# Patient Record
Sex: FEMALE | Race: WHITE | NOT HISPANIC OR LATINO | Employment: UNEMPLOYED | URBAN - NONMETROPOLITAN AREA
[De-identification: names, ages, dates, MRNs, and addresses within clinical notes are randomized per-mention and may not be internally consistent; named-entity substitution may affect disease eponyms.]

---

## 2020-03-18 ENCOUNTER — OFFICE VISIT (OUTPATIENT)
Dept: CARDIOLOGY | Facility: CLINIC | Age: 40
End: 2020-03-18

## 2020-03-18 VITALS
WEIGHT: 119 LBS | OXYGEN SATURATION: 99 % | DIASTOLIC BLOOD PRESSURE: 75 MMHG | HEART RATE: 76 BPM | SYSTOLIC BLOOD PRESSURE: 118 MMHG | BODY MASS INDEX: 21.9 KG/M2 | HEIGHT: 62 IN

## 2020-03-18 DIAGNOSIS — R07.2 PRECORDIAL PAIN: Primary | ICD-10-CM

## 2020-03-18 DIAGNOSIS — R06.02 SHORTNESS OF BREATH: ICD-10-CM

## 2020-03-18 DIAGNOSIS — R00.2 PALPITATIONS: ICD-10-CM

## 2020-03-18 PROCEDURE — 99204 OFFICE O/P NEW MOD 45 MIN: CPT | Performed by: PHYSICIAN ASSISTANT

## 2020-03-18 PROCEDURE — 93000 ELECTROCARDIOGRAM COMPLETE: CPT | Performed by: PHYSICIAN ASSISTANT

## 2020-03-18 RX ORDER — NADOLOL 20 MG/1
20 TABLET ORAL DAILY
COMMUNITY

## 2020-03-18 RX ORDER — CITALOPRAM 40 MG/1
40 TABLET ORAL DAILY
COMMUNITY

## 2020-03-18 NOTE — PATIENT INSTRUCTIONS

## 2020-03-18 NOTE — PROGRESS NOTES
Subjective   Tanya Alarcon is a 39 y.o. female     Chief Complaint   Patient presents with   • Establish Care     Here to establish care        HPI    Patient is a 39-year-old female that presents to the office for initial evaluation.  She has been referred in setting of chest discomfort.  Patient has no history of coronary artery disease.  Apparently in the past she has had a stress test and echocardiogram which was presumably normal.    Recently she has been dealing with anxiety.  She describes recently buying a house and having issues as well as losing a close acquaintance in the past.    When she is under stress and anxiety she will feel this discomfort more but describes it as a diffuse tightness in her chest.  This occurs at random.  However it is exacerbated under stressful situations.  She has dyspnea on occasion.  This is not a routine or persistent dyspnea when exerting but rather she becomes short of breath at random times.  She has no PND orthopnea.    She does feel palpitations on occasion.  These are minimal and manageable apparently.  She has no symptoms of dizziness presyncope or syncope or cerebral embolic events.  Otherwise is doing well      Current Outpatient Medications   Medication Sig Dispense Refill   • citalopram (CeleXA) 40 MG tablet Take 40 mg by mouth Daily.     • nadolol (CORGARD) 20 MG tablet Take 20 mg by mouth Daily.       No current facility-administered medications for this visit.        Patient has no known allergies.    Past Medical History:   Diagnosis Date   • Arrhythmia        Social History     Socioeconomic History   • Marital status: Single     Spouse name: Not on file   • Number of children: Not on file   • Years of education: Not on file   • Highest education level: Not on file   Tobacco Use   • Smoking status: Never Smoker   • Smokeless tobacco: Never Used   Substance and Sexual Activity   • Alcohol use: Never     Frequency: Never   • Drug use: Never   • Sexual activity:  "Defer       Family History   Problem Relation Age of Onset   • No Known Problems Mother    • Heart disease Father    • No Known Problems Sister    • No Known Problems Brother    • No Known Problems Sister        Review of Systems   Constitutional: Negative.  Negative for chills, fatigue and fever.   HENT: Positive for congestion.    Eyes: Positive for visual disturbance (wears contacts ).   Respiratory: Positive for chest tightness (tightness in her neck at times ) and shortness of breath (SOA with increased anxiety ).    Cardiovascular: Positive for chest pain and palpitations (palps ). Negative for leg swelling.   Gastrointestinal: Negative.    Endocrine: Positive for cold intolerance. Negative for heat intolerance.   Genitourinary: Negative.    Musculoskeletal: Positive for back pain. Negative for arthralgias.   Skin: Negative.  Negative for rash and wound.   Allergic/Immunologic: Negative.  Negative for environmental allergies and food allergies.   Neurological: Positive for light-headedness (when standing too fast ) and numbness (down left leg at times ). Negative for dizziness and weakness.   Hematological: Negative.  Does not bruise/bleed easily.   Psychiatric/Behavioral: Negative for sleep disturbance (denies waking with smothering ). The patient is nervous/anxious (increased stress recently ).    All other systems reviewed and are negative.      Objective   Vitals:    03/18/20 1242   BP: 118/75   BP Location: Left arm   Patient Position: Sitting   Pulse: 76   SpO2: 99%   Weight: 54 kg (119 lb)   Height: 157.5 cm (62\")      /75 (BP Location: Left arm, Patient Position: Sitting)   Pulse 76   Ht 157.5 cm (62\")   Wt 54 kg (119 lb)   SpO2 99%   BMI 21.77 kg/m²     Lab Results (most recent)     None          Physical Exam   Constitutional: She is oriented to person, place, and time. She appears well-developed and well-nourished. No distress.   HENT:   Head: Normocephalic and atraumatic.   Eyes: Pupils " are equal, round, and reactive to light. EOM are normal.   Neck: No JVD present.   Cardiovascular: Normal rate, regular rhythm and normal heart sounds. Exam reveals no gallop and no friction rub.   No murmur heard.  Pulmonary/Chest: Effort normal and breath sounds normal. No respiratory distress. She has no wheezes. She has no rales. She exhibits no tenderness.   Musculoskeletal: Normal range of motion. She exhibits no edema.   Neurological: She is alert and oriented to person, place, and time. No cranial nerve deficit.   Skin: Skin is warm and dry. No rash noted. No erythema. No pallor.   Psychiatric: She has a normal mood and affect. Her behavior is normal.   Nursing note and vitals reviewed.      Procedure     ECG 12 Lead  Date/Time: 3/18/2020 12:53 PM  Performed by: Zeke Melton PA  Authorized by: Zeke Melton PA   Previous ECG: no previous ECG available  Comments: EKG demonstrates sinus rhythm at 74 bpm with no acute ST changes                 Assessment/Plan     Problems Addressed this Visit        Cardiovascular and Mediastinum    Palpitations    Relevant Orders    Adult Transthoracic Echo Complete W/ Cont if Necessary Per Protocol    Treadmill Stress Test       Respiratory    Shortness of breath    Relevant Orders    Adult Transthoracic Echo Complete W/ Cont if Necessary Per Protocol    Treadmill Stress Test       Nervous and Auditory    Precordial pain - Primary    Relevant Orders    ECG 12 Lead    Adult Transthoracic Echo Complete W/ Cont if Necessary Per Protocol    Treadmill Stress Test              Recommendation  1.  Patient with complaints of chest discomfort.  Chest pain is atypical and I do feel anxiety is likely the cause of her symptoms.  However she describes a family history of coronary disease and she would like evaluation.  2.  Regular treadmill stress test will be ordered for risk stratification  3.  Echocardiogram to evaluate LV structure and function assess diastolic performance  to rule out any gross cardiac abnormality  4.  We will see her back for follow-up on testing.  She feels palpitations are manageable at this point.  We can consider monitoring in the future if this worsens.  She will follow with primary as scheduled         Tanya Alarcon  reports that she has never smoked. She has never used smokeless tobacco..         Patient's Body mass index is 21.77 kg/m². BMI is within normal parameters. No follow-up required..         Electronically signed by:

## 2020-03-24 ENCOUNTER — TELEPHONE (OUTPATIENT)
Dept: CARDIOLOGY | Facility: CLINIC | Age: 40
End: 2020-03-24

## 2020-03-24 NOTE — TELEPHONE ENCOUNTER
Pt called stating that she forgot to request a letter for work stating she missed work due to appt at our office on 3/18/2020. She stated that she's been busy and has been unable to come  a letter. I informed her that I can mail it to her, she verbalized understanding. Confirmed address w/ pt.       Mailed letter to pt.

## 2020-04-13 ENCOUNTER — HOSPITAL ENCOUNTER (OUTPATIENT)
Dept: CARDIOLOGY | Facility: HOSPITAL | Age: 40
Discharge: HOME | End: 2020-04-13

## 2020-04-13 ENCOUNTER — HOSPITAL ENCOUNTER (OUTPATIENT)
Dept: CARDIOLOGY | Facility: HOSPITAL | Age: 40
Discharge: HOME | End: 2020-04-13
Admitting: PHYSICIAN ASSISTANT

## 2020-04-13 DIAGNOSIS — R06.02 SHORTNESS OF BREATH: ICD-10-CM

## 2020-04-13 DIAGNOSIS — R07.2 PRECORDIAL PAIN: ICD-10-CM

## 2020-04-13 DIAGNOSIS — R00.2 PALPITATIONS: ICD-10-CM

## 2020-04-13 PROCEDURE — 93306 TTE W/DOPPLER COMPLETE: CPT

## 2020-04-13 PROCEDURE — 93018 CV STRESS TEST I&R ONLY: CPT | Performed by: INTERNAL MEDICINE

## 2020-04-13 PROCEDURE — 93016 CV STRESS TEST SUPVJ ONLY: CPT | Performed by: PHYSICIAN ASSISTANT

## 2020-04-13 PROCEDURE — 93306 TTE W/DOPPLER COMPLETE: CPT | Performed by: INTERNAL MEDICINE

## 2020-04-13 PROCEDURE — 93017 CV STRESS TEST TRACING ONLY: CPT

## 2020-04-14 ENCOUNTER — TELEPHONE (OUTPATIENT)
Dept: CARDIOLOGY | Facility: CLINIC | Age: 40
End: 2020-04-14

## 2020-04-14 DIAGNOSIS — R94.31 ST SEGMENT DEPRESSION: ICD-10-CM

## 2020-04-14 DIAGNOSIS — R06.02 SHORTNESS OF BREATH: ICD-10-CM

## 2020-04-14 DIAGNOSIS — R94.39 ABNORMAL STRESS TEST: Primary | ICD-10-CM

## 2020-04-14 DIAGNOSIS — R07.9 CHEST PAIN, UNSPECIFIED TYPE: ICD-10-CM

## 2020-04-14 LAB
BH CV STRESS RECOVERY BP: NORMAL MMHG
BH CV STRESS RECOVERY HR: 106 BPM
MAXIMAL PREDICTED HEART RATE: 181 BPM
PERCENT MAX PREDICTED HR: 86.74 %
STRESS BASELINE BP: NORMAL MMHG
STRESS BASELINE HR: 70 BPM
STRESS PERCENT HR: 102 %
STRESS POST ESTIMATED WORKLOAD: 10.1 METS
STRESS POST EXERCISE DUR MIN: 8 MIN
STRESS POST EXERCISE DUR SEC: 1 SEC
STRESS POST PEAK BP: NORMAL MMHG
STRESS POST PEAK HR: 157 BPM
STRESS TARGET HR: 154 BPM

## 2020-04-14 NOTE — TELEPHONE ENCOUNTER
Treadmill stress:  1.  Adequate exercise capacity without chest pain.  The patient reported dyspnea during the study.     2.  Physiologic heart rate and blood pressure responses.     3.  Beginning at approximately 2 minutes in recovery there was nearly 1 mm of horizontal ST segment depression in the inferior and anterolateral leads suggestive but not diagnostic of ischemia.     4.  Rare PVCs occurred during exercise.     Discussion this study is inconclusive for the diagnosis of ischemia.  Further risk stratification with pharmacologic stress testing and/or stress echocardiography could be considered if felt clinically indicated.    Informed pt of her results and that additional testing is needed, she verbalized understanding.

## 2020-04-14 NOTE — TELEPHONE ENCOUNTER
----- Message from CHEYENNE Burden sent at 4/14/2020 12:50 PM EDT -----  yes  ----- Message -----  From: Aleja Guzmanily  Sent: 4/14/2020  11:38 AM EDT  To: CHEYENNE Burden    So you just need me to order a avani and inform pt? I want to confirm that i'm understanding correctly.   ----- Message -----  From: Zeke Melton PA  Sent: 4/14/2020   9:41 AM EDT  To: Yazmin Guzman    Patient had set up with a Lexiscan stress test diagnosis, abnormal regular stress test, ST depression, chest pain shortness of breath

## 2020-04-16 LAB
BH CV ECHO MEAS - ACS: 1.8 CM
BH CV ECHO MEAS - AO MAX PG: 4.5 MMHG
BH CV ECHO MEAS - AO MEAN PG: 3 MMHG
BH CV ECHO MEAS - AO ROOT AREA (BSA CORRECTED): 1.5
BH CV ECHO MEAS - AO ROOT AREA: 4.2 CM^2
BH CV ECHO MEAS - AO ROOT DIAM: 2.3 CM
BH CV ECHO MEAS - AO V2 MAX: 106 CM/SEC
BH CV ECHO MEAS - AO V2 MEAN: 77.9 CM/SEC
BH CV ECHO MEAS - AO V2 VTI: 23.4 CM
BH CV ECHO MEAS - BSA(HAYCOCK): 1.5 M^2
BH CV ECHO MEAS - BSA: 1.5 M^2
BH CV ECHO MEAS - BZI_BMI: 21.8 KILOGRAMS/M^2
BH CV ECHO MEAS - BZI_METRIC_HEIGHT: 157.5 CM
BH CV ECHO MEAS - BZI_METRIC_WEIGHT: 54 KG
BH CV ECHO MEAS - EDV(CUBED): 62.1 ML
BH CV ECHO MEAS - EDV(MOD-SP4): 57.8 ML
BH CV ECHO MEAS - EDV(TEICH): 68.3 ML
BH CV ECHO MEAS - EF(CUBED): 80.4 %
BH CV ECHO MEAS - EF(MOD-SP4): 59.2 %
BH CV ECHO MEAS - EF(TEICH): 73.5 %
BH CV ECHO MEAS - ESV(CUBED): 12.2 ML
BH CV ECHO MEAS - ESV(MOD-SP4): 23.6 ML
BH CV ECHO MEAS - ESV(TEICH): 18.1 ML
BH CV ECHO MEAS - FS: 41.9 %
BH CV ECHO MEAS - IVS/LVPW: 1.1
BH CV ECHO MEAS - IVSD: 0.92 CM
BH CV ECHO MEAS - LA DIMENSION: 2.3 CM
BH CV ECHO MEAS - LA/AO: 1
BH CV ECHO MEAS - LV DIASTOLIC VOL/BSA (35-75): 37.7 ML/M^2
BH CV ECHO MEAS - LV IVRT: 0.07 SEC
BH CV ECHO MEAS - LV MASS(C)D: 105.2 GRAMS
BH CV ECHO MEAS - LV MASS(C)DI: 68.6 GRAMS/M^2
BH CV ECHO MEAS - LV SYSTOLIC VOL/BSA (12-30): 15.4 ML/M^2
BH CV ECHO MEAS - LVIDD: 4 CM
BH CV ECHO MEAS - LVIDS: 2.3 CM
BH CV ECHO MEAS - LVLD AP4: 6.9 CM
BH CV ECHO MEAS - LVLS AP4: 5.7 CM
BH CV ECHO MEAS - LVOT AREA (M): 2 CM^2
BH CV ECHO MEAS - LVOT AREA: 2 CM^2
BH CV ECHO MEAS - LVOT DIAM: 1.6 CM
BH CV ECHO MEAS - LVPWD: 0.85 CM
BH CV ECHO MEAS - MV A MAX VEL: 62.9 CM/SEC
BH CV ECHO MEAS - MV DEC SLOPE: 511 CM/SEC^2
BH CV ECHO MEAS - MV E MAX VEL: 109 CM/SEC
BH CV ECHO MEAS - MV E/A: 1.7
BH CV ECHO MEAS - RVDD: 1.8 CM
BH CV ECHO MEAS - SI(AO): 63.4 ML/M^2
BH CV ECHO MEAS - SI(CUBED): 32.6 ML/M^2
BH CV ECHO MEAS - SI(MOD-SP4): 22.3 ML/M^2
BH CV ECHO MEAS - SI(TEICH): 32.8 ML/M^2
BH CV ECHO MEAS - SV(AO): 97.2 ML
BH CV ECHO MEAS - SV(CUBED): 49.9 ML
BH CV ECHO MEAS - SV(MOD-SP4): 34.2 ML
BH CV ECHO MEAS - SV(TEICH): 50.2 ML
MAXIMAL PREDICTED HEART RATE: 181 BPM
STRESS TARGET HR: 154 BPM

## 2020-04-17 ENCOUNTER — TELEPHONE (OUTPATIENT)
Dept: CARDIOLOGY | Facility: CLINIC | Age: 40
End: 2020-04-17

## 2020-04-17 NOTE — TELEPHONE ENCOUNTER
Called patient and reviewed echo results with her. Patient was notified to keep appt for nuclear stress test as scheduled. Patient verbalized understanding and had no further questions at this time. -;[facility]      Adult Transthoracic Echo Complete W/ Cont if Necessary Per Protocol   Order: [id number]   Status:  Final result   Visible to patient:  Yes (MyChart) Dx:  Palpitations; Shortness of breath; Pr...   Details     Reading Physician Reading Date Result Priority   Guzman Abbasi MD 4/13/2020 Routine      Result Text     1.  Normal study.     2.  Details as below.

## 2020-05-11 ENCOUNTER — HOSPITAL ENCOUNTER (OUTPATIENT)
Dept: CARDIOLOGY | Facility: HOSPITAL | Age: 40
Discharge: HOME | End: 2020-05-11

## 2020-05-11 DIAGNOSIS — R94.31 ST SEGMENT DEPRESSION: ICD-10-CM

## 2020-05-11 DIAGNOSIS — R06.02 SHORTNESS OF BREATH: ICD-10-CM

## 2020-05-11 DIAGNOSIS — R94.39 ABNORMAL STRESS TEST: ICD-10-CM

## 2020-05-11 DIAGNOSIS — R07.9 CHEST PAIN, UNSPECIFIED TYPE: ICD-10-CM

## 2020-05-11 PROCEDURE — 78452 HT MUSCLE IMAGE SPECT MULT: CPT

## 2020-05-11 PROCEDURE — A9500 TC99M SESTAMIBI: HCPCS | Performed by: INTERNAL MEDICINE

## 2020-05-11 PROCEDURE — 93016 CV STRESS TEST SUPVJ ONLY: CPT | Performed by: NURSE PRACTITIONER

## 2020-05-11 PROCEDURE — 78452 HT MUSCLE IMAGE SPECT MULT: CPT | Performed by: INTERNAL MEDICINE

## 2020-05-11 PROCEDURE — 25010000002 REGADENOSON 0.4 MG/5ML SOLUTION: Performed by: INTERNAL MEDICINE

## 2020-05-11 PROCEDURE — 93018 CV STRESS TEST I&R ONLY: CPT | Performed by: INTERNAL MEDICINE

## 2020-05-11 PROCEDURE — 0 TECHNETIUM SESTAMIBI: Performed by: INTERNAL MEDICINE

## 2020-05-11 PROCEDURE — 93017 CV STRESS TEST TRACING ONLY: CPT

## 2020-05-11 RX ADMIN — TECHNETIUM TC 99M SESTAMIBI 1 DOSE: 1 INJECTION INTRAVENOUS at 10:45

## 2020-05-11 RX ADMIN — REGADENOSON 0.4 MG: 0.08 INJECTION, SOLUTION INTRAVENOUS at 10:45

## 2020-05-11 RX ADMIN — TECHNETIUM TC 99M SESTAMIBI 1 DOSE: 1 INJECTION INTRAVENOUS at 09:02

## 2020-05-12 LAB
BH CV STRESS COMMENTS STAGE 1: NORMAL
BH CV STRESS DOSE REGADENOSON STAGE 1: 0.4
BH CV STRESS DURATION MIN STAGE 1: 0
BH CV STRESS DURATION SEC STAGE 1: 10
BH CV STRESS PROTOCOL 1: NORMAL
BH CV STRESS RECOVERY BP: NORMAL MMHG
BH CV STRESS RECOVERY HR: 112 BPM
BH CV STRESS STAGE 1: 1
MAXIMAL PREDICTED HEART RATE: 181 BPM
PERCENT MAX PREDICTED HR: 66.3 %
STRESS BASELINE BP: NORMAL MMHG
STRESS BASELINE HR: 69 BPM
STRESS PERCENT HR: 78 %
STRESS POST PEAK BP: NORMAL MMHG
STRESS POST PEAK HR: 120 BPM
STRESS TARGET HR: 154 BPM

## 2020-05-21 ENCOUNTER — OFFICE VISIT (OUTPATIENT)
Dept: CARDIOLOGY | Facility: CLINIC | Age: 40
End: 2020-05-21

## 2020-05-21 VITALS
SYSTOLIC BLOOD PRESSURE: 127 MMHG | BODY MASS INDEX: 21.98 KG/M2 | OXYGEN SATURATION: 99 % | TEMPERATURE: 97.5 F | WEIGHT: 120.2 LBS | DIASTOLIC BLOOD PRESSURE: 74 MMHG | HEART RATE: 82 BPM

## 2020-05-21 DIAGNOSIS — R00.2 PALPITATIONS: ICD-10-CM

## 2020-05-21 DIAGNOSIS — R06.02 SHORTNESS OF BREATH: ICD-10-CM

## 2020-05-21 DIAGNOSIS — R07.9 CHEST PAIN, UNSPECIFIED TYPE: Primary | ICD-10-CM

## 2020-05-21 PROCEDURE — 99213 OFFICE O/P EST LOW 20 MIN: CPT | Performed by: PHYSICIAN ASSISTANT

## 2020-05-21 NOTE — PROGRESS NOTES
Problem list     Subjective   Tanya Alarcon is a 39 y.o. female     Chief Complaint   Patient presents with   • Chest Pain     presents for stress and echo f/u   • Palpitations   • Shortness of Breath   Problem list  1.  Atypical chest pain  1.1 regular treadmill stress test indeterminate with borderline EKG changes  1.2 nuclear stress test May 2020 demonstrated no evidence of ischemia preserved LV function  2.  Preserved systolic function  3.  Anxiety    HPI    Patient is a 39-year-old female that presents back to follow-up on testing.  She initially presented with atypical symptoms of chest discomfort dyspnea.  Patient feels well.  She describes she feels much of her symptoms are related to stress or anxiety.  She recently lost her grandmother and is very tearful on examination.    She has some sharp pain or tightness at times.  Nothing that has been progressive or changing.  Dyspnea is related but no severe shortness of breath.  No PND orthopnea.    She does not describe worsening palpitations.  No dizziness presyncope or syncope.  Patient is otherwise doing well      Current Outpatient Medications on File Prior to Visit   Medication Sig Dispense Refill   • citalopram (CeleXA) 40 MG tablet Take 40 mg by mouth Daily.     • nadolol (CORGARD) 20 MG tablet Take 20 mg by mouth Daily.       No current facility-administered medications on file prior to visit.        Patient has no known allergies.    Past Medical History:   Diagnosis Date   • Arrhythmia        Social History     Socioeconomic History   • Marital status: Single     Spouse name: Not on file   • Number of children: Not on file   • Years of education: Not on file   • Highest education level: Not on file   Tobacco Use   • Smoking status: Never Smoker   • Smokeless tobacco: Never Used   Substance and Sexual Activity   • Alcohol use: Never     Frequency: Never   • Drug use: Never   • Sexual activity: Defer       Family History   Problem Relation Age of Onset      • No Known Problems Mother    • Heart disease Father    • No Known Problems Sister    • No Known Problems Brother    • No Known Problems Sister        Review of Systems   HENT: Negative.    Eyes: Negative.    Respiratory: Positive for shortness of breath (on exertion).    Cardiovascular: Positive for chest pain and palpitations. Negative for leg swelling.   Gastrointestinal: Negative.    Endocrine: Negative.    Genitourinary: Negative.    Musculoskeletal: Positive for back pain.   Skin: Negative.    Allergic/Immunologic: Negative.    Neurological: Negative.    Hematological: Negative.    Psychiatric/Behavioral: Negative.    All other systems reviewed and are negative.      Objective   Vitals:    05/21/20 1423   BP: 127/74   BP Location: Left arm   Patient Position: Sitting   Pulse: 82   Temp: 97.5 °F (36.4 °C)   SpO2: 99%   Weight: 54.5 kg (120 lb 3.2 oz)      /74 (BP Location: Left arm, Patient Position: Sitting)   Pulse 82   Temp 97.5 °F (36.4 °C)   Wt 54.5 kg (120 lb 3.2 oz)   SpO2 99%   BMI 21.98 kg/m²     Lab Results (most recent)     None          Physical Exam   Constitutional: She is oriented to person, place, and time. She appears well-developed and well-nourished. No distress.   HENT:   Head: Normocephalic and atraumatic.   Eyes: Conjunctivae are normal. Right eye exhibits no discharge. Left eye exhibits no discharge. No scleral icterus.   Neck: No JVD present.   Cardiovascular: Normal rate, regular rhythm and normal heart sounds. Exam reveals no gallop and no friction rub.   No murmur heard.  Pulmonary/Chest: Effort normal and breath sounds normal. No respiratory distress. She has no wheezes. She has no rales. She exhibits no tenderness.   Abdominal: There is no guarding.   Musculoskeletal: Normal range of motion. She exhibits no edema.   Neurological: She is alert and oriented to person, place, and time. No cranial nerve deficit.   Skin: Skin is warm and dry. No rash noted. No erythema. No  pallor.   Psychiatric: She has a normal mood and affect. Her behavior is normal.   Nursing note and vitals reviewed.      Procedure   Procedures       Assessment/Plan     Problems Addressed this Visit        Cardiovascular and Mediastinum    Palpitations       Respiratory    Shortness of breath       Nervous and Auditory    Chest pain - Primary            Recommendation  1.  Patient with complaints of chest pain and dyspnea.  Chest pain is atypical.  Her palpitations can be experienced under anxiety.  For now she feels she is doing well.  Testing is largely unremarkable and she has no symptoms to suggest malignant arrhythmia.  Therefore we will see her back in a year or as needed.  She will follow with primary as scheduled         Tanya Alarcon  reports that she has never smoked. She has never used smokeless tobacco.      Patient's Body mass index is 21.98 kg/m². BMI is within normal parameters. No follow-up required..       Electronically signed by:

## 2021-05-17 ENCOUNTER — OFFICE VISIT (OUTPATIENT)
Dept: CARDIOLOGY | Facility: CLINIC | Age: 41
End: 2021-05-17

## 2021-05-17 VITALS
WEIGHT: 131 LBS | HEART RATE: 68 BPM | DIASTOLIC BLOOD PRESSURE: 70 MMHG | OXYGEN SATURATION: 99 % | BODY MASS INDEX: 24.11 KG/M2 | HEIGHT: 62 IN | SYSTOLIC BLOOD PRESSURE: 101 MMHG

## 2021-05-17 DIAGNOSIS — R06.02 SHORTNESS OF BREATH: ICD-10-CM

## 2021-05-17 DIAGNOSIS — R07.9 CHEST PAIN, UNSPECIFIED TYPE: Primary | ICD-10-CM

## 2021-05-17 DIAGNOSIS — R00.2 PALPITATIONS: ICD-10-CM

## 2021-05-17 PROCEDURE — 93000 ELECTROCARDIOGRAM COMPLETE: CPT | Performed by: PHYSICIAN ASSISTANT

## 2021-05-17 PROCEDURE — 99213 OFFICE O/P EST LOW 20 MIN: CPT | Performed by: PHYSICIAN ASSISTANT

## 2021-05-17 NOTE — PROGRESS NOTES
Problem list     Subjective   Tanya Alarcon is a 40 y.o. female     Chief Complaint   Patient presents with   • Follow-up     1 year        HPI  The patient presents back to the clinic today for a 1 year follow-up.  This pleasant patient was seen historically because of chest discomfort, but also with her profound family history of cardiac issues.  She was initially scheduled for a treadmill protocol for ischemia assessment and risk stratification.  She only went a very short duration on treadmill protocol and this was felt to be an inconclusive test because of time spent on treadmill.  She had a subsequent nuclear stress test which indicated no evidence of ischemia.  Her echo was normal.  She has since done well.  She has chest discomfort at times which is very much atypical by description.  She has stable dyspnea.  She reports stable fatigue.  She reports only rare palpitations, no sustained dysrhythmic activity.  The patient has no further complaints otherwise.    Current Outpatient Medications on File Prior to Visit   Medication Sig Dispense Refill   • citalopram (CeleXA) 40 MG tablet Take 40 mg by mouth Daily.     • nadolol (CORGARD) 20 MG tablet Take 20 mg by mouth Daily.       No current facility-administered medications on file prior to visit.       Patient has no known allergies.    Past Medical History:   Diagnosis Date   • Arrhythmia        Social History     Socioeconomic History   • Marital status: Single     Spouse name: Not on file   • Number of children: Not on file   • Years of education: Not on file   • Highest education level: Not on file   Tobacco Use   • Smoking status: Never Smoker   • Smokeless tobacco: Never Used   Substance and Sexual Activity   • Alcohol use: Never   • Drug use: Never   • Sexual activity: Defer       Family History   Problem Relation Age of Onset   • No Known Problems Mother    • Heart disease Father    • No Known Problems Sister    • No Known Problems Brother    • No Known  "Problems Sister        Review of Systems   Constitutional: Negative.  Negative for chills, fatigue and fever.   HENT: Negative for congestion, rhinorrhea and sore throat.    Eyes: Negative.  Negative for visual disturbance.   Respiratory: Positive for chest tightness. Negative for shortness of breath and wheezing.    Cardiovascular: Positive for palpitations. Negative for chest pain and leg swelling.   Gastrointestinal: Negative.    Endocrine: Negative.  Negative for cold intolerance.   Genitourinary: Negative.    Musculoskeletal: Negative.  Negative for arthralgias, back pain and neck pain.   Skin: Negative.  Negative for rash and wound.   Allergic/Immunologic: Positive for environmental allergies.   Neurological: Positive for headaches. Negative for dizziness, weakness and numbness.   Hematological: Bruises/bleeds easily (bruises ).   Psychiatric/Behavioral: Negative.  Negative for sleep disturbance.       Objective   Vitals:    05/17/21 1303   BP: 101/70   BP Location: Left arm   Patient Position: Sitting   Pulse: 68   SpO2: 99%   Weight: 59.4 kg (131 lb)   Height: 157.5 cm (62\")      /70 (BP Location: Left arm, Patient Position: Sitting)   Pulse 68   Ht 157.5 cm (62\")   Wt 59.4 kg (131 lb)   SpO2 99%   BMI 23.96 kg/m²    Lab Results (most recent)     None        Physical Exam  Vitals and nursing note reviewed.   Constitutional:       General: She is not in acute distress.     Appearance: She is well-developed.   HENT:      Head: Normocephalic and atraumatic.   Eyes:      Conjunctiva/sclera: Conjunctivae normal.      Pupils: Pupils are equal, round, and reactive to light.   Neck:      Vascular: No JVD.      Trachea: No tracheal deviation.   Cardiovascular:      Rate and Rhythm: Normal rate and regular rhythm.      Heart sounds: Normal heart sounds.   Pulmonary:      Effort: Pulmonary effort is normal.      Breath sounds: Normal breath sounds.   Abdominal:      General: Bowel sounds are normal. There is " no distension.      Palpations: Abdomen is soft. There is no mass.      Tenderness: There is no abdominal tenderness. There is no guarding or rebound.   Musculoskeletal:         General: No tenderness or deformity. Normal range of motion.      Cervical back: Normal range of motion and neck supple.   Skin:     General: Skin is warm and dry.      Coloration: Skin is not pale.      Findings: No erythema or rash.   Neurological:      Mental Status: She is alert and oriented to person, place, and time.   Psychiatric:         Behavior: Behavior normal.         Thought Content: Thought content normal.         Judgment: Judgment normal.           Procedure     ECG 12 Lead    Date/Time: 5/17/2021 1:06 PM  Performed by: Esau Mojica PA  Authorized by: Esau Mojica PA   Comparison: compared with previous ECG from 3/18/2020  Comparison to previous ECG: Sinus rhythm, rate 63, normal axis, no acute changes noted.                 Assessment/Plan      Diagnosis Plan   1. Chest pain, unspecified type     2. Shortness of breath     3. Palpitations         1.  At this time, the patient appears to be doing fairly well from general cardiovascular standpoint.  Her chest pain is atypical.  Her dyspnea is nonproblematic.  Palpitations are very infrequent, with no evidence of sustained dysrhythmic activity.    2.  I would continue nadolol without change.  We will make no adjustments to her medical regimen otherwise.    3.  Previous stress and echo studies were benign.  Nothing further would be indicated from cardiovascular standpoint.    4.  We will continue to see the patient on a yearly basis, with her calling immediately for any recurrent issues.                      Electronically signed by:

## 2021-05-17 NOTE — PATIENT INSTRUCTIONS

## 2021-05-21 ENCOUNTER — APPOINTMENT (OUTPATIENT)
Dept: WOMENS IMAGING | Facility: HOSPITAL | Age: 41
End: 2021-05-21

## 2021-05-21 PROCEDURE — 77067 SCR MAMMO BI INCL CAD: CPT | Performed by: RADIOLOGY

## 2021-05-21 PROCEDURE — 77063 BREAST TOMOSYNTHESIS BI: CPT | Performed by: RADIOLOGY

## 2022-05-17 ENCOUNTER — OFFICE VISIT (OUTPATIENT)
Dept: CARDIOLOGY | Facility: CLINIC | Age: 42
End: 2022-05-17

## 2022-05-17 VITALS
WEIGHT: 121.2 LBS | BODY MASS INDEX: 22.31 KG/M2 | SYSTOLIC BLOOD PRESSURE: 106 MMHG | HEART RATE: 74 BPM | HEIGHT: 62 IN | OXYGEN SATURATION: 100 % | DIASTOLIC BLOOD PRESSURE: 67 MMHG

## 2022-05-17 DIAGNOSIS — R53.83 OTHER FATIGUE: ICD-10-CM

## 2022-05-17 DIAGNOSIS — R07.9 CHEST PAIN, UNSPECIFIED TYPE: Primary | ICD-10-CM

## 2022-05-17 DIAGNOSIS — R06.02 SHORTNESS OF BREATH: ICD-10-CM

## 2022-05-17 PROCEDURE — 99213 OFFICE O/P EST LOW 20 MIN: CPT | Performed by: PHYSICIAN ASSISTANT

## 2022-05-17 PROCEDURE — 93000 ELECTROCARDIOGRAM COMPLETE: CPT | Performed by: PHYSICIAN ASSISTANT

## 2022-05-17 NOTE — PROGRESS NOTES
Problem list     Subjective   Tanya Alarcon is a 41 y.o. female     Chief Complaint   Patient presents with   • Follow-up     1 year    • Chest Pain       HPI  The patient presents to clinic today for yearly follow-up.  We have seen her historically in the setting of chest discomfort and symptoms otherwise.  The concern was profound family history of cardiac issues.  She eventually was scheduled for stress test and an echo.  Nuclear stress test indicated no evidence of ischemia.  Echo was basically normal.  We have followed her clinically since.  She has rare chest pain now, atypical by description.  Her dyspnea is at baseline.  She has a degree of fatigue which is unchanged as well from just last year.  She has no further complaints otherwise and feels that she is doing well for the most part from cardiovascular standpoint.    Current Outpatient Medications on File Prior to Visit   Medication Sig Dispense Refill   • citalopram (CeleXA) 40 MG tablet Take 40 mg by mouth Daily.     • nadolol (CORGARD) 20 MG tablet Take 20 mg by mouth Daily.       No current facility-administered medications on file prior to visit.       Patient has no known allergies.    Past Medical History:   Diagnosis Date   • Arrhythmia        Social History     Socioeconomic History   • Marital status: Single   Tobacco Use   • Smoking status: Never Smoker   • Smokeless tobacco: Never Used   Substance and Sexual Activity   • Alcohol use: Never   • Drug use: Never   • Sexual activity: Defer       Family History   Problem Relation Age of Onset   • No Known Problems Mother    • Heart disease Father    • No Known Problems Sister    • No Known Problems Brother    • No Known Problems Sister        Review of Systems   Constitutional: Negative.  Negative for chills, fatigue and fever.   HENT: Negative.  Negative for congestion, rhinorrhea and sore throat.    Eyes: Positive for visual disturbance (glasses/ contacts).   Respiratory: Negative.  Negative for  "chest tightness, shortness of breath and wheezing.    Cardiovascular: Negative.  Negative for chest pain, palpitations and leg swelling.   Gastrointestinal: Negative.    Endocrine: Negative.    Genitourinary: Negative.    Musculoskeletal: Positive for back pain. Negative for arthralgias and neck pain.   Skin: Negative.  Negative for rash and wound.   Allergic/Immunologic: Positive for environmental allergies.   Neurological: Negative.  Negative for dizziness, weakness, numbness and headaches.   Hematological: Negative.  Does not bruise/bleed easily.   Psychiatric/Behavioral: Negative.  Negative for sleep disturbance.       Objective   Vitals:    05/17/22 1327   BP: 106/67   BP Location: Left arm   Patient Position: Sitting   Pulse: 74   SpO2: 100%   Weight: 55 kg (121 lb 3.2 oz)   Height: 157.5 cm (62\")      /67 (BP Location: Left arm, Patient Position: Sitting)   Pulse 74   Ht 157.5 cm (62\")   Wt 55 kg (121 lb 3.2 oz)   SpO2 100%   BMI 22.17 kg/m²    Lab Results (most recent)     None        Physical Exam  Vitals and nursing note reviewed.   Constitutional:       General: She is not in acute distress.     Appearance: She is well-developed.   HENT:      Head: Normocephalic and atraumatic.   Eyes:      Conjunctiva/sclera: Conjunctivae normal.      Pupils: Pupils are equal, round, and reactive to light.   Neck:      Vascular: No JVD.      Trachea: No tracheal deviation.   Cardiovascular:      Rate and Rhythm: Normal rate and regular rhythm.      Heart sounds: Normal heart sounds.   Pulmonary:      Effort: Pulmonary effort is normal.      Breath sounds: Normal breath sounds.   Abdominal:      General: Bowel sounds are normal. There is no distension.      Palpations: Abdomen is soft. There is no mass.      Tenderness: There is no abdominal tenderness. There is no guarding or rebound.   Musculoskeletal:         General: No tenderness or deformity. Normal range of motion.      Cervical back: Normal range of " motion and neck supple.   Skin:     General: Skin is warm and dry.      Coloration: Skin is not pale.      Findings: No erythema or rash.   Neurological:      Mental Status: She is alert and oriented to person, place, and time.   Psychiatric:         Behavior: Behavior normal.         Thought Content: Thought content normal.         Judgment: Judgment normal.           Procedure     ECG 12 Lead    Date/Time: 5/17/2022 1:29 PM  Performed by: Esau Mojica PA  Authorized by: Esau Mojica PA   Comparison: compared with previous ECG from 5/17/2021  Comparison to previous ECG: Sinus rhythm, rate 75, normal axis, no acute changes noted.                 Assessment & Plan      Diagnosis Plan   1. Chest pain, unspecified type     2. Shortness of breath     3. Other fatigue     1.  At this time, the patient appears to be doing well from general cardiovascular standpoint.  Her chest pain is atypical.  Her dyspnea and fatigue are at baseline.  She has no further cardiovascular symptoms or issues at this time.    2.  Prior stress and echo studies were unremarkable.  As she is doing well I do not feel further work-up is warranted.    3.  I would continue medical regimen without change.    4.  Nothing further and we will continue to see her on a yearly evaluation.                        Electronically signed by:

## 2022-07-28 ENCOUNTER — APPOINTMENT (OUTPATIENT)
Dept: WOMENS IMAGING | Facility: HOSPITAL | Age: 42
End: 2022-07-28

## 2022-07-28 PROCEDURE — 77067 SCR MAMMO BI INCL CAD: CPT | Performed by: RADIOLOGY

## 2022-07-28 PROCEDURE — 77063 BREAST TOMOSYNTHESIS BI: CPT | Performed by: RADIOLOGY

## 2023-05-17 ENCOUNTER — OFFICE VISIT (OUTPATIENT)
Dept: CARDIOLOGY | Facility: CLINIC | Age: 43
End: 2023-05-17
Payer: COMMERCIAL

## 2023-05-17 VITALS
WEIGHT: 115 LBS | SYSTOLIC BLOOD PRESSURE: 101 MMHG | OXYGEN SATURATION: 100 % | DIASTOLIC BLOOD PRESSURE: 67 MMHG | BODY MASS INDEX: 21.16 KG/M2 | HEART RATE: 63 BPM | HEIGHT: 62 IN

## 2023-05-17 DIAGNOSIS — R00.2 PALPITATIONS: ICD-10-CM

## 2023-05-17 DIAGNOSIS — R06.02 SHORTNESS OF BREATH: Primary | ICD-10-CM

## 2023-05-17 DIAGNOSIS — R53.83 OTHER FATIGUE: ICD-10-CM

## 2023-05-17 PROCEDURE — 99213 OFFICE O/P EST LOW 20 MIN: CPT | Performed by: PHYSICIAN ASSISTANT

## 2023-05-17 PROCEDURE — 1160F RVW MEDS BY RX/DR IN RCRD: CPT | Performed by: PHYSICIAN ASSISTANT

## 2023-05-17 PROCEDURE — 1159F MED LIST DOCD IN RCRD: CPT | Performed by: PHYSICIAN ASSISTANT

## 2023-05-17 PROCEDURE — 93000 ELECTROCARDIOGRAM COMPLETE: CPT | Performed by: PHYSICIAN ASSISTANT

## 2023-05-17 NOTE — PROGRESS NOTES
Subjective   Tanya Alarcon is a 42 y.o. female     Chief Complaint   Patient presents with   • Follow-up     CHEST PAIN       HPI  The patient presents in clinic today for yearly follow-up.  We have seen the patient historically because of chest discomfort and symptoms noted at that time.  There was concern because of extensive family history of cardiac issues.  She was scheduled for stress and echo studies.  Nuclear stress test indicated no ischemia.  Echo indicated preserved systolic function with no significant valvular nor structural abnormalities.  Since, the patient is done fairly well.  She has chest pain when very stressed.  Dyspnea remains at baseline.  She has no failure nor dysrhythmic symptoms.  Blood pressure and dysrhythmic symptoms noted in the past are fairly well controlled on low-dose beta-blocker.  She has no further complaints.      Current Outpatient Medications   Medication Sig Dispense Refill   • citalopram (CeleXA) 40 MG tablet Take 1 tablet by mouth Daily.     • nadolol (CORGARD) 20 MG tablet Take 1 tablet by mouth Daily.       No current facility-administered medications for this visit.       Patient has no known allergies.    Past Medical History:   Diagnosis Date   • Arrhythmia        Social History     Socioeconomic History   • Marital status: Single   Tobacco Use   • Smoking status: Never   • Smokeless tobacco: Never   Substance and Sexual Activity   • Alcohol use: Never   • Drug use: Never   • Sexual activity: Defer       Family History   Problem Relation Age of Onset   • No Known Problems Mother    • Heart disease Father    • No Known Problems Sister    • No Known Problems Brother    • No Known Problems Sister        Review of Systems   Constitutional: Negative.  Negative for activity change, appetite change, chills, fatigue and fever.   Eyes: Negative.  Negative for visual disturbance.   Respiratory: Negative.  Negative for apnea, cough, chest tightness, shortness of breath and  "wheezing.    Cardiovascular: Negative.  Negative for chest pain, palpitations and leg swelling.   Gastrointestinal: Negative for blood in stool.   Endocrine: Negative.  Negative for cold intolerance and heat intolerance.   Genitourinary: Negative.  Negative for hematuria.   Skin: Negative.  Negative for color change, rash and wound.   Allergic/Immunologic: Negative.  Negative for environmental allergies and food allergies.   Neurological: Positive for headaches. Negative for dizziness, syncope, weakness, light-headedness and numbness.   Hematological: Negative.  Does not bruise/bleed easily.   Psychiatric/Behavioral: Negative.  Negative for sleep disturbance.       Objective     Vitals:    05/17/23 1539   BP: 101/67   BP Location: Left arm   Patient Position: Sitting   Cuff Size: Adult   Pulse: 63   SpO2: 100%   Weight: 52.2 kg (115 lb)   Height: 157.5 cm (62\")        /67 (BP Location: Left arm, Patient Position: Sitting, Cuff Size: Adult)   Pulse 63   Ht 157.5 cm (62\")   Wt 52.2 kg (115 lb)   SpO2 100%   BMI 21.03 kg/m²      Lab Results (most recent)     None          Physical Exam  Vitals and nursing note reviewed.   Constitutional:       General: She is not in acute distress.     Appearance: She is well-developed.   HENT:      Head: Normocephalic and atraumatic.   Eyes:      Conjunctiva/sclera: Conjunctivae normal.      Pupils: Pupils are equal, round, and reactive to light.   Neck:      Vascular: No JVD.      Trachea: No tracheal deviation.   Cardiovascular:      Rate and Rhythm: Normal rate and regular rhythm.      Heart sounds: Normal heart sounds.   Pulmonary:      Effort: Pulmonary effort is normal.      Breath sounds: Normal breath sounds.   Abdominal:      General: Bowel sounds are normal. There is no distension.      Palpations: Abdomen is soft. There is no mass.      Tenderness: There is no abdominal tenderness. There is no guarding or rebound.   Musculoskeletal:         General: No tenderness " or deformity. Normal range of motion.      Cervical back: Normal range of motion and neck supple.   Skin:     General: Skin is warm and dry.      Coloration: Skin is not pale.      Findings: No erythema or rash.   Neurological:      Mental Status: She is alert and oriented to person, place, and time.   Psychiatric:         Behavior: Behavior normal.         Thought Content: Thought content normal.         Judgment: Judgment normal.         Procedure     ECG 12 Lead    Date/Time: 5/17/2023 3:51 PM  Performed by: Esau Mojica PA  Authorized by: Esau Mojica PA   Comparison: not compared with previous ECG   Comments: Sinus rhythm, probable normal axis, right atrial enlargement, possible left atrial enlargement, RSR prime V1 V2, no acute changes noted.                 Assessment & Plan      Diagnosis Plan   1. Shortness of breath        2. Other fatigue        3. Palpitations          1.  At this time, the patient appears to be doing well.  There is been concern in the past given symptoms and extensive family history.  Previous stress and echo studies have been benign.  We have reviewed this again with her in the clinic today.    2.  Dyspnea and fatigue are at baseline and nonproblematic.  Palpitations have been well treated with beta-blocker therapy.  I would continue medications without change.  We will continue to follow her clinically through the office.    3.  For change in course, she will call immediately.  We will continue to see her on routine 6 to 12-month intervals.           Patient did not bring med list or medicine bottles to appointment, med list has been reviewed and updated based on patient's knowledge of their meds.         Electronically signed by:

## 2024-05-28 ENCOUNTER — OFFICE VISIT (OUTPATIENT)
Dept: CARDIOLOGY | Facility: CLINIC | Age: 44
End: 2024-05-28
Payer: COMMERCIAL

## 2024-05-28 VITALS
HEART RATE: 76 BPM | SYSTOLIC BLOOD PRESSURE: 110 MMHG | BODY MASS INDEX: 21.53 KG/M2 | HEIGHT: 62 IN | DIASTOLIC BLOOD PRESSURE: 70 MMHG | WEIGHT: 117 LBS | OXYGEN SATURATION: 98 %

## 2024-05-28 DIAGNOSIS — R06.02 SHORTNESS OF BREATH: ICD-10-CM

## 2024-05-28 DIAGNOSIS — R00.2 PALPITATIONS: ICD-10-CM

## 2024-05-28 DIAGNOSIS — R07.2 PRECORDIAL PAIN: Primary | ICD-10-CM

## 2024-05-28 PROCEDURE — 93000 ELECTROCARDIOGRAM COMPLETE: CPT | Performed by: PHYSICIAN ASSISTANT

## 2024-05-28 PROCEDURE — 1160F RVW MEDS BY RX/DR IN RCRD: CPT | Performed by: PHYSICIAN ASSISTANT

## 2024-05-28 PROCEDURE — 1159F MED LIST DOCD IN RCRD: CPT | Performed by: PHYSICIAN ASSISTANT

## 2024-05-28 PROCEDURE — 99213 OFFICE O/P EST LOW 20 MIN: CPT | Performed by: PHYSICIAN ASSISTANT

## 2024-05-28 RX ORDER — LORATADINE 10 MG/1
1 TABLET ORAL DAILY
COMMUNITY

## 2024-05-28 RX ORDER — ALBUTEROL SULFATE 90 UG/1
2 AEROSOL, METERED RESPIRATORY (INHALATION) EVERY 6 HOURS PRN
COMMUNITY

## 2024-05-28 NOTE — PROGRESS NOTES
Problem list     Subjective   Tanya Alarcon is a 43 y.o. female     Chief Complaint   Patient presents with   • Follow-up     Yearly follow up denies chest pain or palpitation's   • Shortness of Breath       HPI  The patient presents in clinic today for yearly evaluation.  We have seen the patient historically given chest discomfort and symptoms otherwise noted at that time.  The main concern was extensive family history of cardiac issues in the past.  She was scheduled for stress and echo studies.  Stress test indicated no evidence of ischemia.  Echo indicated normal systolic function with no significant valvular nor structural abnormalities.  Since the above workup, the patient has done well.  She has had no further chest pain.  Dyspnea and fatigue are at baseline.  She has typically normotensive status on current medical regimen.  Since starting beta-blocker therapy, she really has had no further dysrhythmic symptoms.  She has no further complaints.    Current Outpatient Medications on File Prior to Visit   Medication Sig Dispense Refill   • albuterol sulfate  (90 Base) MCG/ACT inhaler Inhale 2 puffs Every 6 (Six) Hours As Needed.     • citalopram (CeleXA) 40 MG tablet Take 1 tablet by mouth Daily.     • loratadine (CLARITIN) 10 MG tablet Take 1 tablet by mouth Daily.     • nadolol (CORGARD) 20 MG tablet Take 1 tablet by mouth Daily.       No current facility-administered medications on file prior to visit.       Sumatriptan    Past Medical History:   Diagnosis Date   • Arrhythmia    • Mitral valve prolapse        Social History     Socioeconomic History   • Marital status: Single   Tobacco Use   • Smoking status: Never   • Smokeless tobacco: Never   Substance and Sexual Activity   • Alcohol use: Never   • Drug use: Never   • Sexual activity: Not Currently     Partners: Male     Birth control/protection: Surgical       Family History   Problem Relation Age of Onset   • No Known Problems Mother    • Heart  "disease Father    • No Known Problems Sister    • No Known Problems Brother    • No Known Problems Sister        Review of Systems   Constitutional: Negative.  Negative for chills, diaphoresis, fatigue and fever.   HENT: Negative.     Eyes: Negative.  Negative for visual disturbance.   Respiratory:  Positive for shortness of breath. Negative for apnea, cough, chest tightness and wheezing.    Cardiovascular: Negative.  Negative for chest pain, palpitations and leg swelling.   Gastrointestinal: Negative.  Negative for abdominal pain and blood in stool.   Endocrine: Negative.    Genitourinary: Negative.  Negative for hematuria.   Musculoskeletal:  Positive for back pain. Negative for arthralgias, myalgias, neck pain and neck stiffness.   Skin: Negative.  Negative for rash and wound.   Allergic/Immunologic: Negative.  Positive for environmental allergies (seasonal). Negative for food allergies.   Neurological: Negative.  Positive for headaches (migraines). Negative for dizziness, syncope, weakness, light-headedness and numbness.   Hematological: Negative.  Does not bruise/bleed easily.   Psychiatric/Behavioral: Negative.  Negative for sleep disturbance.        Objective   Vitals:    05/28/24 1534   BP: 110/70   Pulse: 76   SpO2: 98%   Weight: 53.1 kg (117 lb)   Height: 157.5 cm (62\")      /70   Pulse 76   Ht 157.5 cm (62\")   Wt 53.1 kg (117 lb)   SpO2 98%   BMI 21.40 kg/m²    Lab Results (most recent)       None          Physical Exam  Vitals and nursing note reviewed.   Constitutional:       General: She is not in acute distress.     Appearance: She is well-developed.   HENT:      Head: Normocephalic and atraumatic.   Eyes:      Conjunctiva/sclera: Conjunctivae normal.      Pupils: Pupils are equal, round, and reactive to light.   Neck:      Vascular: No JVD.      Trachea: No tracheal deviation.   Cardiovascular:      Rate and Rhythm: Normal rate and regular rhythm.      Heart sounds: Normal heart sounds. "   Pulmonary:      Effort: Pulmonary effort is normal.      Breath sounds: Normal breath sounds.   Abdominal:      General: Bowel sounds are normal. There is no distension.      Palpations: Abdomen is soft. There is no mass.      Tenderness: There is no abdominal tenderness. There is no guarding or rebound.   Musculoskeletal:         General: No tenderness or deformity. Normal range of motion.      Cervical back: Normal range of motion and neck supple.   Skin:     General: Skin is warm and dry.      Coloration: Skin is not pale.      Findings: No erythema or rash.   Neurological:      Mental Status: She is alert and oriented to person, place, and time.   Psychiatric:         Behavior: Behavior normal.         Thought Content: Thought content normal.         Judgment: Judgment normal.         Procedure     ECG 12 Lead    Date/Time: 5/28/2024 3:38 PM  Performed by: Esau Mojica PA    Authorized by: Esau Mojica PA  Comparison: compared with previous ECG from 5/17/2023  Comparison to previous ECG: Sinus rhythm, rate 72, normal axis, RSR prime V1 V2, no acute changes noted.           Assessment & Plan      Diagnosis Plan   1. Precordial pain        2. Palpitations        3. Shortness of breath          1.  At this time, the patient appears to be doing well.  Her chest pain has resolved.  Dyspnea is at baseline.  Palpitations are now nonproblematic on beta-blocker therapy.    2.  Has the patient is doing well, we will continue medical therapy without change.    3.  Previous stress and echo studies have been benign.  We have reviewed this in detail with her.  No further evaluation is indicated.    4.  For change in clinical course she will return immediately.  Nothing further she is doing well.  We will see her on a yearly evaluation.                 Electronically signed by:

## 2025-06-02 ENCOUNTER — OFFICE VISIT (OUTPATIENT)
Dept: CARDIOLOGY | Facility: CLINIC | Age: 45
End: 2025-06-02
Payer: COMMERCIAL

## 2025-06-02 VITALS
HEART RATE: 92 BPM | SYSTOLIC BLOOD PRESSURE: 116 MMHG | OXYGEN SATURATION: 99 % | WEIGHT: 140 LBS | BODY MASS INDEX: 25.76 KG/M2 | HEIGHT: 62 IN | DIASTOLIC BLOOD PRESSURE: 69 MMHG

## 2025-06-02 DIAGNOSIS — R00.2 PALPITATIONS: ICD-10-CM

## 2025-06-02 DIAGNOSIS — R07.2 PRECORDIAL PAIN: Primary | ICD-10-CM

## 2025-06-02 DIAGNOSIS — R06.02 SHORTNESS OF BREATH: ICD-10-CM

## 2025-06-02 PROCEDURE — 99213 OFFICE O/P EST LOW 20 MIN: CPT | Performed by: PHYSICIAN ASSISTANT

## 2025-06-02 PROCEDURE — 93000 ELECTROCARDIOGRAM COMPLETE: CPT | Performed by: PHYSICIAN ASSISTANT

## 2025-06-02 PROCEDURE — 1160F RVW MEDS BY RX/DR IN RCRD: CPT | Performed by: PHYSICIAN ASSISTANT

## 2025-06-02 PROCEDURE — 1159F MED LIST DOCD IN RCRD: CPT | Performed by: PHYSICIAN ASSISTANT

## 2025-06-02 RX ORDER — IPRATROPIUM BROMIDE AND ALBUTEROL 20; 100 UG/1; UG/1
1 SPRAY, METERED RESPIRATORY (INHALATION)
COMMUNITY
Start: 2025-04-15

## 2025-06-02 RX ORDER — MONTELUKAST SODIUM 10 MG/1
10 TABLET ORAL NIGHTLY
COMMUNITY
Start: 2025-04-15

## 2025-06-02 RX ORDER — ESCITALOPRAM OXALATE 10 MG/1
10 TABLET ORAL DAILY
COMMUNITY

## 2025-06-02 NOTE — PROGRESS NOTES
Problem list     Subjective   Tanya Alarcon is a 44 y.o. female     Chief Complaint   Patient presents with    Follow-up     Yearly follow up        HPI  The patient presents in the clinic today for yearly follow-up.  We have seen her historically given her chest discomfort and symptoms otherwise noted previously.  Her main concern has been extensive family history of cardiac issues.  She was scheduled for stress and echo evaluation initially.  Stress indicated no evidence of ischemia.  Echo send reported normal systolic function with no significant valvular or structural abnormalities.  She has since done well.  She has baseline dyspnea and fatigue which are chronic.  She has no chest pain.  She has no dysrhythmic issues currently.  She has been treated historically with beta-blocker therapy.  She reports that since being on this, she has no palpitations or tachycardic issues of any significance.  She has no further complaints.    Current Outpatient Medications on File Prior to Visit   Medication Sig Dispense Refill    albuterol sulfate  (90 Base) MCG/ACT inhaler Inhale 2 puffs Every 6 (Six) Hours As Needed.      Combivent Respimat  MCG/ACT inhaler Inhale 1 puff 4 (Four) Times a Day.      escitalopram (LEXAPRO) 10 MG tablet Take 1 tablet by mouth Daily.      loratadine (CLARITIN) 10 MG tablet Take 1 tablet by mouth Daily.      montelukast (SINGULAIR) 10 MG tablet Take 1 tablet by mouth Every Night.      nadolol (CORGARD) 20 MG tablet Take 1 tablet by mouth Daily.       No current facility-administered medications on file prior to visit.       Sumatriptan    Past Medical History:   Diagnosis Date    Arrhythmia     Heart valve disease     Mitral valve prolapse        Social History     Socioeconomic History    Marital status: Single   Tobacco Use    Smoking status: Never    Smokeless tobacco: Never   Vaping Use    Vaping status: Never Used   Substance and Sexual Activity    Alcohol use: Never    Drug  "use: Never    Sexual activity: Not Currently     Partners: Male     Birth control/protection: Surgical       Family History   Problem Relation Age of Onset    No Known Problems Mother     Heart disease Father     No Known Problems Sister     No Known Problems Brother     No Known Problems Sister        Review of Systems   Constitutional:  Positive for fatigue. Negative for chills, diaphoresis and fever.   HENT:  Negative for hearing loss.    Eyes: Negative.  Negative for visual disturbance.   Respiratory: Negative.  Negative for apnea, cough, chest tightness, shortness of breath and wheezing.    Cardiovascular: Negative.  Negative for chest pain, palpitations and leg swelling.   Gastrointestinal: Negative.  Negative for abdominal pain and blood in stool.   Endocrine: Negative.    Genitourinary: Negative.  Negative for hematuria.   Musculoskeletal:  Positive for back pain, neck pain and neck stiffness. Negative for arthralgias and myalgias.   Skin: Negative.  Negative for rash and wound.   Allergic/Immunologic: Positive for environmental allergies (seasonal). Negative for food allergies.   Neurological:  Positive for headaches (migraines). Negative for dizziness, syncope, weakness, light-headedness and numbness.   Hematological: Negative.  Does not bruise/bleed easily.   Psychiatric/Behavioral:  Negative for sleep disturbance.        Objective   Vitals:    06/02/25 1542   BP: 116/69   Pulse: 92   SpO2: 99%   Weight: 63.5 kg (140 lb)   Height: 157.5 cm (62\")      /69   Pulse 92   Ht 157.5 cm (62\")   Wt 63.5 kg (140 lb)   SpO2 99%   BMI 25.61 kg/m²    Lab Results (most recent)       None          Physical Exam  Vitals and nursing note reviewed.   Constitutional:       General: She is not in acute distress.     Appearance: She is well-developed.   HENT:      Head: Normocephalic and atraumatic.   Eyes:      Conjunctiva/sclera: Conjunctivae normal.      Pupils: Pupils are equal, round, and reactive to light. "   Neck:      Vascular: No JVD.      Trachea: No tracheal deviation.   Cardiovascular:      Rate and Rhythm: Normal rate and regular rhythm.      Heart sounds: Normal heart sounds.   Pulmonary:      Effort: Pulmonary effort is normal.      Breath sounds: Normal breath sounds.   Abdominal:      General: Bowel sounds are normal. There is no distension.      Palpations: Abdomen is soft. There is no mass.      Tenderness: There is no abdominal tenderness. There is no guarding or rebound.   Musculoskeletal:         General: No tenderness or deformity. Normal range of motion.      Cervical back: Normal range of motion and neck supple.   Skin:     General: Skin is warm and dry.      Coloration: Skin is not pale.      Findings: No erythema or rash.   Neurological:      Mental Status: She is alert and oriented to person, place, and time.   Psychiatric:         Behavior: Behavior normal.         Thought Content: Thought content normal.         Judgment: Judgment normal.           Procedure     ECG 12 Lead    Date/Time: 6/2/2025 3:47 PM  Performed by: Esau Mojica PA    Authorized by: Esau Mojica PA  Comparison: compared with previous ECG from 5/28/2024  Comparison to previous ECG: Sinus rhythm, rate 93, normal axis, minor nonspecific ST and T wave changes, no acute changes noted.             Assessment & Plan      Diagnosis Plan   1. Precordial pain        2. Palpitations        3. Shortness of breath          1.  At this time, the patient continues to do well from cardiac standpoint.  She denies continued chest pain.  Palpitations have remained minimal since starting beta-blocker therapy.  Dyspnea is mostly nonproblematic.    2.  Prior stress and echo studies have been benign.  We have reviewed this in detail with her.  No further evaluation is indicated as the patient is doing well.    3.  For change in clinical course she will return immediately.  We will continue to see the patient on routine yearly follow-ups  at this time.           Tanya Alarcon  reports that she has never smoked. She has never used smokeless tobacco.       Electronically signed by: